# Patient Record
Sex: FEMALE | Race: WHITE | NOT HISPANIC OR LATINO | URBAN - METROPOLITAN AREA
[De-identification: names, ages, dates, MRNs, and addresses within clinical notes are randomized per-mention and may not be internally consistent; named-entity substitution may affect disease eponyms.]

---

## 2019-04-17 ENCOUNTER — OFFICE (OUTPATIENT)
Dept: URBAN - METROPOLITAN AREA CLINIC 33 | Facility: CLINIC | Age: 29
End: 2019-04-17

## 2019-04-17 VITALS
TEMPERATURE: 97.9 F | SYSTOLIC BLOOD PRESSURE: 122 MMHG | WEIGHT: 131 LBS | DIASTOLIC BLOOD PRESSURE: 73 MMHG | HEIGHT: 67 IN | HEART RATE: 51 BPM

## 2019-04-17 DIAGNOSIS — R11.10 VOMITING, UNSPECIFIED: ICD-10-CM

## 2019-04-17 DIAGNOSIS — R12 HEARTBURN: ICD-10-CM

## 2019-04-17 DIAGNOSIS — F50.2 BULIMIA NERVOSA: ICD-10-CM

## 2019-04-17 DIAGNOSIS — B37.0 CANDIDAL STOMATITIS: ICD-10-CM

## 2019-04-17 DIAGNOSIS — R06.89 OTHER ABNORMALITIES OF BREATHING: ICD-10-CM

## 2019-04-17 DIAGNOSIS — R14.2 ERUCTATION: ICD-10-CM

## 2019-04-17 DIAGNOSIS — J02.9 ACUTE PHARYNGITIS, UNSPECIFIED: ICD-10-CM

## 2019-04-17 PROCEDURE — 99244 OFF/OP CNSLTJ NEW/EST MOD 40: CPT

## 2019-04-17 NOTE — SERVICEHPINOTES
MIKEL GILLIAM   is a   28   year old    female who is being seen in consultation at the request of   ADI WALTER   for "throat pain" and suspected GERD. She mentions since 09/2018 having persistent "sore throat" that led to ENT consult that noted evidence of reflux and advised GI consult. She notes associated sx include: belching, globus sensation, regurgitation, heartburn. She reports reflux episodes few time per week No nocturnal events. She stopped sodas x 2 months ago. Caffeine intake: No coffee but yes black/green tea 2-3x/week (advised to cut down on caffeine). She is taking "apple cider vinegar pills" daily. She was advised to try OTC Omeprazole 20 mg qd (taken 30 min prior to breakfast) x 3 weeks that made no difference and this was tried about 3 months ago. She then tried Rx Omeprazole 40 mg qd x few weeks that also made no improvement to her "sore throat." Most recently she has been taking Zantac 150 mg BID x 3 weeks so far that has not helped either. She has daily BMs, BSS type 4 to 5 (She states no laxative use). H/o Bulimia in remission. She states no known h/o cardiac or pulmonary disease. Denies n/v, cough, abdominal pain, diarrhea, melena, weight loss. No prior EGD. BR

## 2019-04-18 LAB
AMBIG ABBREV BMP8 DEFAULT: (no result)
BASIC METABOLIC PANEL (8): BUN/CREATININE RATIO: 13 (ref 9–23)
BASIC METABOLIC PANEL (8): BUN: 12 MG/DL (ref 6–20)
BASIC METABOLIC PANEL (8): CALCIUM: 9.3 MG/DL (ref 8.7–10.2)
BASIC METABOLIC PANEL (8): CARBON DIOXIDE, TOTAL: 22 MMOL/L (ref 20–29)
BASIC METABOLIC PANEL (8): CHLORIDE: 103 MMOL/L (ref 96–106)
BASIC METABOLIC PANEL (8): CREATININE: 0.91 MG/DL (ref 0.57–1)
BASIC METABOLIC PANEL (8): EGFR IF AFRICN AM: 99 ML/MIN/1.73 (ref 59–?)
BASIC METABOLIC PANEL (8): EGFR IF NONAFRICN AM: 86 ML/MIN/1.73 (ref 59–?)
BASIC METABOLIC PANEL (8): GLUCOSE: 89 MG/DL (ref 65–99)
BASIC METABOLIC PANEL (8): POTASSIUM: 4.5 MMOL/L (ref 3.5–5.2)
BASIC METABOLIC PANEL (8): SODIUM: 139 MMOL/L (ref 134–144)
CBC/DIFF AMBIGUOUS DEFAULT: BASO (ABSOLUTE): 0 X10E3/UL (ref 0–0.2)
CBC/DIFF AMBIGUOUS DEFAULT: BASOS: 0 %
CBC/DIFF AMBIGUOUS DEFAULT: EOS (ABSOLUTE): 0.1 X10E3/UL (ref 0–0.4)
CBC/DIFF AMBIGUOUS DEFAULT: EOS: 1 %
CBC/DIFF AMBIGUOUS DEFAULT: HEMATOCRIT: 39.1 % (ref 34–46.6)
CBC/DIFF AMBIGUOUS DEFAULT: HEMOGLOBIN: 12.7 G/DL (ref 11.1–15.9)
CBC/DIFF AMBIGUOUS DEFAULT: IMMATURE GRANS (ABS): 0 X10E3/UL (ref 0–0.1)
CBC/DIFF AMBIGUOUS DEFAULT: IMMATURE GRANULOCYTES: 0 %
CBC/DIFF AMBIGUOUS DEFAULT: LYMPHS (ABSOLUTE): 1.9 X10E3/UL (ref 0.7–3.1)
CBC/DIFF AMBIGUOUS DEFAULT: LYMPHS: 33 %
CBC/DIFF AMBIGUOUS DEFAULT: MCH: 30.9 PG (ref 26.6–33)
CBC/DIFF AMBIGUOUS DEFAULT: MCHC: 32.5 G/DL (ref 31.5–35.7)
CBC/DIFF AMBIGUOUS DEFAULT: MCV: 95 FL (ref 79–97)
CBC/DIFF AMBIGUOUS DEFAULT: MONOCYTES(ABSOLUTE): 0.6 X10E3/UL (ref 0.1–0.9)
CBC/DIFF AMBIGUOUS DEFAULT: MONOCYTES: 10 %
CBC/DIFF AMBIGUOUS DEFAULT: NEUTROPHILS (ABSOLUTE): 3.1 X10E3/UL (ref 1.4–7)
CBC/DIFF AMBIGUOUS DEFAULT: NEUTROPHILS: 56 %
CBC/DIFF AMBIGUOUS DEFAULT: PLATELETS: 281 X10E3/UL (ref 150–379)
CBC/DIFF AMBIGUOUS DEFAULT: RBC: 4.11 X10E6/UL (ref 3.77–5.28)
CBC/DIFF AMBIGUOUS DEFAULT: RDW: 13.4 % (ref 12.3–15.4)
CBC/DIFF AMBIGUOUS DEFAULT: WBC: 5.6 X10E3/UL (ref 3.4–10.8)
CELIAC DISEASE COMPREHENSIVE: DEAMIDATED GLIADIN ABS, IGA: 2 UNITS (ref 0–19)
CELIAC DISEASE COMPREHENSIVE: DEAMIDATED GLIADIN ABS, IGG: 4 UNITS (ref 0–19)
CELIAC DISEASE COMPREHENSIVE: ENDOMYSIAL ANTIBODY IGA: NEGATIVE
CELIAC DISEASE COMPREHENSIVE: IMMUNOGLOBULIN A, QN, SERUM: 111 MG/DL (ref 87–352)
CELIAC DISEASE COMPREHENSIVE: T-TRANSGLUTAMINASE (TTG) IGA: <2 U/ML
CELIAC DISEASE COMPREHENSIVE: T-TRANSGLUTAMINASE (TTG) IGG: <2 U/ML

## 2019-04-19 ENCOUNTER — OFFICE (OUTPATIENT)
Dept: URBAN - METROPOLITAN AREA CLINIC 100 | Facility: CLINIC | Age: 29
End: 2019-04-19

## 2019-04-19 ENCOUNTER — OFFICE (OUTPATIENT)
Dept: URBAN - METROPOLITAN AREA PATHOLOGY 17 | Facility: PATHOLOGY | Age: 29
End: 2019-04-19

## 2019-04-19 VITALS
OXYGEN SATURATION: 99 % | HEIGHT: 67 IN | HEART RATE: 56 BPM | SYSTOLIC BLOOD PRESSURE: 111 MMHG | HEART RATE: 55 BPM | RESPIRATION RATE: 19 BRPM | SYSTOLIC BLOOD PRESSURE: 125 MMHG | OXYGEN SATURATION: 100 % | WEIGHT: 125 LBS | SYSTOLIC BLOOD PRESSURE: 115 MMHG | DIASTOLIC BLOOD PRESSURE: 72 MMHG | DIASTOLIC BLOOD PRESSURE: 79 MMHG | HEART RATE: 74 BPM | DIASTOLIC BLOOD PRESSURE: 75 MMHG | HEART RATE: 62 BPM | RESPIRATION RATE: 18 BRPM | OXYGEN SATURATION: 85 % | SYSTOLIC BLOOD PRESSURE: 122 MMHG | DIASTOLIC BLOOD PRESSURE: 81 MMHG | SYSTOLIC BLOOD PRESSURE: 112 MMHG | TEMPERATURE: 98.2 F | TEMPERATURE: 98.1 F | DIASTOLIC BLOOD PRESSURE: 74 MMHG | RESPIRATION RATE: 16 BRPM

## 2019-04-19 DIAGNOSIS — R14.2 ERUCTATION: ICD-10-CM

## 2019-04-19 DIAGNOSIS — R12 HEARTBURN: ICD-10-CM

## 2019-04-19 DIAGNOSIS — J02.9 ACUTE PHARYNGITIS, UNSPECIFIED: ICD-10-CM

## 2019-04-19 DIAGNOSIS — K20.8 OTHER ESOPHAGITIS: ICD-10-CM

## 2019-04-19 DIAGNOSIS — R11.10 VOMITING, UNSPECIFIED: ICD-10-CM

## 2019-04-19 DIAGNOSIS — R06.89 OTHER ABNORMALITIES OF BREATHING: ICD-10-CM

## 2019-04-19 PROCEDURE — 43239 EGD BIOPSY SINGLE/MULTIPLE: CPT

## 2019-04-19 PROCEDURE — 88312 SPECIAL STAINS GROUP 1: CPT

## 2019-04-19 PROCEDURE — 88305 TISSUE EXAM BY PATHOLOGIST: CPT

## 2023-11-30 ENCOUNTER — TELEHEALTH PROVIDED OTHER THAN IN PATIENT'S HOME (OUTPATIENT)
Dept: URBAN - METROPOLITAN AREA TELEHEALTH 7 | Facility: TELEHEALTH | Age: 33
End: 2023-11-30

## 2023-11-30 VITALS — WEIGHT: 132 LBS | HEIGHT: 67 IN

## 2023-11-30 DIAGNOSIS — R74.8 ABNORMAL LEVELS OF OTHER SERUM ENZYMES: ICD-10-CM

## 2023-11-30 PROCEDURE — 99204 OFFICE O/P NEW MOD 45 MIN: CPT | Performed by: PHYSICIAN ASSISTANT

## 2023-11-30 NOTE — SERVICENOTES
Patient's visit was conducted through [employer] video telecommunication. Patient consented before the start of visit as to understanding of privacy concerns, possible technological failure, and their responsibility of carrying out instructions of plan.

## 2023-11-30 NOTE — INTERFACERESULTNOTES
Labs are fairly unremarkable. Iron level is normal but could be a bit higher - consider taking Vitron C iron pill daily for 2 months. Mild bilirubin elevation is consistent with "Gilbert's syndrome" which is benign. Hep B & C negative. The lab ran a more expensive Hep C test than was ordered, so if there is coverage issue, that should be taken care of by the lab since they ran the wrong test. Celiac testing negative. No evidence of an intrinsic liver issue, so the mild liver enzyme elevation may be from other causes. I would recommend a f/u visit to discuss further options and monitoring recommendations.

## 2023-12-06 LAB
ALPHA-1-ANTITRYPSIN QN: 135 MG/DL (ref 83–199)
CBC (INCLUDES DIFF/PLT): ABSOLUTE BAND NEUTROPHILS: NORMAL CELLS/UL
CBC (INCLUDES DIFF/PLT): ABSOLUTE BASOPHILS: 11 CELLS/UL (ref 0–200)
CBC (INCLUDES DIFF/PLT): ABSOLUTE BLASTS: NORMAL CELLS/UL
CBC (INCLUDES DIFF/PLT): ABSOLUTE EOSINOPHILS: 32 CELLS/UL (ref 15–500)
CBC (INCLUDES DIFF/PLT): ABSOLUTE LYMPHOCYTES: 1246 CELLS/UL (ref 850–3900)
CBC (INCLUDES DIFF/PLT): ABSOLUTE METAMYELOCYTES: NORMAL CELLS/UL
CBC (INCLUDES DIFF/PLT): ABSOLUTE MONOCYTES: 514 CELLS/UL (ref 200–950)
CBC (INCLUDES DIFF/PLT): ABSOLUTE MYELOCYTES: NORMAL CELLS/UL
CBC (INCLUDES DIFF/PLT): ABSOLUTE NEUTROPHILS: 3498 CELLS/UL (ref 1500–7800)
CBC (INCLUDES DIFF/PLT): ABSOLUTE NUCLEATED RBC: NORMAL CELLS/UL
CBC (INCLUDES DIFF/PLT): ABSOLUTE PROMYELOCYTES: NORMAL CELLS/UL
CBC (INCLUDES DIFF/PLT): BAND NEUTROPHILS: NORMAL %
CBC (INCLUDES DIFF/PLT): BASOPHILS: 0.2 %
CBC (INCLUDES DIFF/PLT): BLASTS: NORMAL %
CBC (INCLUDES DIFF/PLT): COMMENT(S): NORMAL
CBC (INCLUDES DIFF/PLT): EOSINOPHILS: 0.6 %
CBC (INCLUDES DIFF/PLT): HEMATOCRIT: 38.1 % (ref 35–45)
CBC (INCLUDES DIFF/PLT): HEMOGLOBIN: 13.3 G/DL (ref 11.7–15.5)
CBC (INCLUDES DIFF/PLT): LYMPHOCYTES: 23.5 %
CBC (INCLUDES DIFF/PLT): MCH: 33.3 PG — HIGH (ref 27–33)
CBC (INCLUDES DIFF/PLT): MCHC: 34.9 G/DL (ref 32–36)
CBC (INCLUDES DIFF/PLT): MCV: 95.3 FL (ref 80–100)
CBC (INCLUDES DIFF/PLT): METAMYELOCYTES: NORMAL %
CBC (INCLUDES DIFF/PLT): MONOCYTES: 9.7 %
CBC (INCLUDES DIFF/PLT): MPV: 10.3 FL (ref 7.5–12.5)
CBC (INCLUDES DIFF/PLT): MYELOCYTES: NORMAL %
CBC (INCLUDES DIFF/PLT): NEUTROPHILS: 66 %
CBC (INCLUDES DIFF/PLT): NUCLEATED RBC: NORMAL /100 WBC
CBC (INCLUDES DIFF/PLT): PLATELET COUNT: 275 THOUSAND/UL (ref 140–400)
CBC (INCLUDES DIFF/PLT): PROMYELOCYTES: NORMAL %
CBC (INCLUDES DIFF/PLT): RDW: 12.1 % (ref 11–15)
CBC (INCLUDES DIFF/PLT): REACTIVE LYMPHOCYTES: NORMAL %
CBC (INCLUDES DIFF/PLT): RED BLOOD CELL COUNT: 4 MILLION/UL (ref 3.8–5.1)
CBC (INCLUDES DIFF/PLT): WHITE BLOOD CELL COUNT: 5.3 THOUSAND/UL (ref 3.8–10.8)
CELIAC DISEASE COMPREHENSIVE PANEL: IMMUNOGLOBULIN A: 136 MG/DL (ref 47–310)
CELIAC DISEASE COMPREHENSIVE PANEL: INTERPRETATION: (no result)
CELIAC DISEASE COMPREHENSIVE PANEL: TISSUE TRANSGLUTAMINASE AB, IGA: <1 U/ML
CERULOPLASMIN: 35 MG/DL (ref 18–53)
HCV RNA, QUANTITATIVE REAL TIME PCR: NORMAL IU/ML
HCV RNA, QUANTITATIVE REAL TIME PCR: NORMAL LOG IU/ML
HEMOGLOBIN A1C: 5 % OF TOTAL HGB (ref ?–5.7)
HEPATIC FUNCTION PANEL: ALBUMIN/GLOBULIN RATIO: 1.8 (CALC) (ref 1–2.5)
HEPATIC FUNCTION PANEL: ALBUMIN: 4.7 G/DL (ref 3.6–5.1)
HEPATIC FUNCTION PANEL: ALKALINE PHOSPHATASE: 31 U/L (ref 31–125)
HEPATIC FUNCTION PANEL: ALT: 37 U/L — HIGH (ref 6–29)
HEPATIC FUNCTION PANEL: AST: 33 U/L — HIGH (ref 10–30)
HEPATIC FUNCTION PANEL: BILIRUBIN, DIRECT: 0.3 MG/DL — HIGH (ref ?–0.2)
HEPATIC FUNCTION PANEL: BILIRUBIN, INDIRECT: 1 MG/DL (CALC) (ref 0.2–1.2)
HEPATIC FUNCTION PANEL: BILIRUBIN, TOTAL: 1.3 MG/DL — HIGH (ref 0.2–1.2)
HEPATIC FUNCTION PANEL: GLOBULIN: 2.6 G/DL (CALC) (ref 1.9–3.7)
HEPATIC FUNCTION PANEL: PROTEIN, TOTAL: 7.3 G/DL (ref 6.1–8.1)
HEPATITIS B SURFACE ANTIGEN W/REFL CONFIRM: CONFIRMATION: NORMAL
HEPATITIS B SURFACE ANTIGEN W/REFL CONFIRM: HEPATITIS B SURFACE ANTIGEN: NORMAL
IMMUNOGLOBULIN G: 1033 MG/DL (ref 600–1640)
IRON, TIBC AND FERRITIN PANEL: % SATURATION: 28 % (CALC) (ref 16–45)
IRON, TIBC AND FERRITIN PANEL: FERRITIN: 36 NG/ML (ref 16–154)
IRON, TIBC AND FERRITIN PANEL: IRON BINDING CAPACITY: 459 MCG/DL (CALC) — HIGH (ref 250–450)
IRON, TIBC AND FERRITIN PANEL: IRON, TOTAL: 130 MCG/DL (ref 40–190)
PROTHROMBIN TIME-INR: INR: 1
PROTHROMBIN TIME-INR: PT: 10.3 SEC (ref 9–11.5)

## 2024-01-04 ENCOUNTER — TELEHEALTH PROVIDED OTHER THAN IN PATIENT'S HOME (OUTPATIENT)
Dept: URBAN - METROPOLITAN AREA TELEHEALTH 7 | Facility: TELEHEALTH | Age: 34
End: 2024-01-04

## 2024-01-04 VITALS — HEIGHT: 67 IN | WEIGHT: 132 LBS

## 2024-01-04 DIAGNOSIS — R74.8 ABNORMAL LEVELS OF OTHER SERUM ENZYMES: ICD-10-CM

## 2024-01-04 PROCEDURE — 99214 OFFICE O/P EST MOD 30 MIN: CPT | Mod: 95 | Performed by: PHYSICIAN ASSISTANT

## 2024-01-04 NOTE — SERVICENOTES
Patient's visit was conducted through [employer] video telecommunication. Patient consented before the start of visit as to understanding of privacy concerns, possible technological failure, and their responsibility of carrying out instructions of plan.

## 2024-01-04 NOTE — SERVICEHPINOTES
PATIENT VERIFIED BY DATE OF BIRTH AND NAME. Patient has been consented for this telecommunication visit.
panfilo whittaker 32 yo female presents for f/u elevated liver enzymes. Had mild liver enzyme elevation recently, and borderline elevated levels in past. She saw rheumatology due to concern for Sjogren's. She notes dry eyes, dry mouth. Also has h/o Raynaud's. Was told to f/u again with rheum in one year.
br
panfilo  Recent serologic evaluation with us in regards to her liver was negative for any evidence for intrinsic liver disease. U/S of her liver 9/9/22 (patient showed me results on her electronic device) showed normal liver.She had COVID August 17th and didn't feel back to normal until mid-October. Was still not feeling herself at the time of the September labs that showed the more prominent liver enzyme elevation. Latest labs closer to normal. Has been on OCP for 20 years. Uses some basic supplements but no herbal meds. Has h/o eating disorder (anorexia) as a teenager which she states she's recovered from.  ROS as above, otherwise negative.br
br 12/4/23 iron sat 28%, ferritin 36, HgbA1C 5.0, AST/ALT 33/37, bili 1.3, direct 0.3, IgG wnl, INR 1.0, Hgb 13.3, plt 275, A1AT wnl, cerulo wnl, HBsAg neg, HCV RNA not detected, celiac panel negbrOct 2023 bili 1.3, AST/ALT 38/35, ASMA neg, AMA neg, ESR wnl, CRP wnlbr9/27/23 AST/ALT 51/59 ARINA 1:640, Vit D 57, , TG 60, HDL 89br9/30/22 AST/ALT 37/38br

## 2024-01-17 ENCOUNTER — OFFICE (OUTPATIENT)
Dept: URBAN - METROPOLITAN AREA CLINIC 102 | Facility: CLINIC | Age: 34
End: 2024-01-17

## 2024-01-17 DIAGNOSIS — R74.8 ABNORMAL LEVELS OF OTHER SERUM ENZYMES: ICD-10-CM

## 2024-01-17 PROCEDURE — 76981 USE PARENCHYMA: CPT | Performed by: PHYSICIAN ASSISTANT

## 2024-10-29 ENCOUNTER — TELEHEALTH PROVIDED OTHER THAN IN PATIENT'S HOME (OUTPATIENT)
Dept: URBAN - METROPOLITAN AREA TELEHEALTH 7 | Facility: TELEHEALTH | Age: 34
End: 2024-10-29
Payer: COMMERCIAL

## 2024-10-29 VITALS — HEIGHT: 67 IN | WEIGHT: 127 LBS

## 2024-10-29 DIAGNOSIS — R74.8 ABNORMAL LEVELS OF OTHER SERUM ENZYMES: ICD-10-CM

## 2024-10-29 PROCEDURE — 99214 OFFICE O/P EST MOD 30 MIN: CPT | Mod: 95 | Performed by: PHYSICIAN ASSISTANT
